# Patient Record
Sex: MALE | Race: ASIAN
[De-identification: names, ages, dates, MRNs, and addresses within clinical notes are randomized per-mention and may not be internally consistent; named-entity substitution may affect disease eponyms.]

---

## 2019-01-25 ENCOUNTER — TRANSCRIPTION ENCOUNTER (OUTPATIENT)
Age: 59
End: 2019-01-25

## 2019-02-06 PROBLEM — Z00.00 ENCOUNTER FOR PREVENTIVE HEALTH EXAMINATION: Status: ACTIVE | Noted: 2019-02-06

## 2019-02-12 ENCOUNTER — APPOINTMENT (OUTPATIENT)
Dept: ORTHOPEDIC SURGERY | Facility: CLINIC | Age: 59
End: 2019-02-12
Payer: COMMERCIAL

## 2019-02-12 VITALS
SYSTOLIC BLOOD PRESSURE: 135 MMHG | HEIGHT: 69 IN | WEIGHT: 200 LBS | HEART RATE: 77 BPM | DIASTOLIC BLOOD PRESSURE: 73 MMHG | BODY MASS INDEX: 29.62 KG/M2

## 2019-02-12 DIAGNOSIS — Z86.39 PERSONAL HISTORY OF OTHER ENDOCRINE, NUTRITIONAL AND METABOLIC DISEASE: ICD-10-CM

## 2019-02-12 DIAGNOSIS — M54.5 LOW BACK PAIN: ICD-10-CM

## 2019-02-12 DIAGNOSIS — Z78.9 OTHER SPECIFIED HEALTH STATUS: ICD-10-CM

## 2019-02-12 PROCEDURE — 99204 OFFICE O/P NEW MOD 45 MIN: CPT

## 2019-02-12 PROCEDURE — 72100 X-RAY EXAM L-S SPINE 2/3 VWS: CPT

## 2019-02-12 NOTE — PHYSICAL EXAM
[UE/LE] : Sensory: Intact in bilateral upper & lower extremities [ALL] : Biceps, brachioradialis, triceps, patellar, ankle and plantar 2+ and symmetric bilaterally [Normal] : Oriented to person, place, and time, insight and judgement were intact and the affect was normal [Harper's Sign] : negative Harper's sign [Pronator Drift] : negative pronator drift [SLR] : negative straight leg raise [de-identified] : NTTP L spine and b/l paraspinals L region. \par Skin intact L spine. No rashes, ulcers, blisters. \par No lymphedema. \par Rapid alternating movements- Intact. \par Full and non-painful ROM RUE, LUE, RLE, and LLE. Skin intact RUE, LUE, RLE, and LLE. No rashes, blisters, ulcers. NTTP RUE, LUE, RLE, and LLE. No evidence of dislocation or subluxation B/L.\par  [de-identified] : 2 views AP and Lat of LS Spine from [id number] . Read and dictated by Dr. Quentin Vasquez Board Certified orthopaedic surgeon 02/12/19 - NL\par CT of T/L Spine - images and reports reviewed\par

## 2019-02-12 NOTE — HISTORY OF PRESENT ILLNESS
[Ice] : relieved by ice [Recumbency] : relieved by recumbency [Rest] : relieved by rest [de-identified] : Pt presents with c/o constant low back pain s/p slip while going down the stairs  on 01/24/19. Pain is present with movement. he denies radiating pain, numbness,tingling to B/l LE. Pt took meloxicam ans uses cream to alleviate pain. Pt had a CT done. Pt does not participate with PT at this time [4] : an average pain level of 4/10 [3] : a minimum pain level of 3/10 [7] : a maximum pain level of 7/10 [Constant] : ~He/She~ states the symptoms seem to be constant [Ataxia] : no ataxia [Incontinence] : no incontinence [Loss of Dexterity] : good dexterity [Urinary Ret.] : no urinary retention [de-identified] : certain movement exacerbates pain

## 2021-12-15 ENCOUNTER — APPOINTMENT (OUTPATIENT)
Dept: OTOLARYNGOLOGY | Facility: CLINIC | Age: 61
End: 2021-12-15
Payer: MEDICAID

## 2021-12-15 VITALS
BODY MASS INDEX: 28.14 KG/M2 | SYSTOLIC BLOOD PRESSURE: 111 MMHG | WEIGHT: 190 LBS | HEART RATE: 80 BPM | DIASTOLIC BLOOD PRESSURE: 70 MMHG | HEIGHT: 69 IN

## 2021-12-15 DIAGNOSIS — H66.90 OTITIS MEDIA, UNSPECIFIED, UNSPECIFIED EAR: ICD-10-CM

## 2021-12-15 PROCEDURE — 99203 OFFICE O/P NEW LOW 30 MIN: CPT

## 2021-12-15 PROCEDURE — 92567 TYMPANOMETRY: CPT

## 2021-12-15 PROCEDURE — 92557 COMPREHENSIVE HEARING TEST: CPT

## 2021-12-15 RX ORDER — CIPROFLOXACIN AND DEXAMETHASONE 3; 1 MG/ML; MG/ML
0.3-0.1 SUSPENSION/ DROPS AURICULAR (OTIC)
Qty: 1 | Refills: 2 | Status: ACTIVE | COMMUNITY
Start: 2021-12-15 | End: 1900-01-01

## 2021-12-15 NOTE — ASSESSMENT
[FreeTextEntry1] : WATER PERCAUTION\par CIPRODEX BID\par  MILD EARLY PRESBYCAUSIA\par F/U 2 WEEKS PRN

## 2021-12-15 NOTE — HISTORY OF PRESENT ILLNESS
[de-identified] : RIGHT EAR DISCOMFORT FOR THE PAST FEW DAYS\par MEDICAL HX REVIEWED\par WORKS AS \par COVID VACCINATION UP TO DATE

## 2021-12-15 NOTE — PHYSICAL EXAM
[Normal] : mucosa is normal [Midline] : trachea located in midline position [de-identified] : RIGHT CANAL IRRITATION EXTENDING TO TM

## 2021-12-22 ENCOUNTER — APPOINTMENT (OUTPATIENT)
Dept: OTOLARYNGOLOGY | Facility: CLINIC | Age: 61
End: 2021-12-22
Payer: MEDICAID

## 2021-12-22 VITALS — BODY MASS INDEX: 28.14 KG/M2 | HEIGHT: 69 IN | WEIGHT: 190 LBS

## 2021-12-22 PROCEDURE — 99213 OFFICE O/P EST LOW 20 MIN: CPT

## 2021-12-22 NOTE — HISTORY OF PRESENT ILLNESS
[de-identified] : RIGHT EAR DISCOMFORT not improving\par MEDICAL HX REVIEWED\par WORKS AS \par COVID VACCINATION UP TO DATE

## 2021-12-22 NOTE — PHYSICAL EXAM
[de-identified] : RIGHT CANAL IRRITATION EXTENDING TO TM/ debris suctioned/ symptoms improved [Normal] : mucosa is normal [Midline] : trachea located in midline position

## 2021-12-28 ENCOUNTER — APPOINTMENT (OUTPATIENT)
Dept: OTOLARYNGOLOGY | Facility: CLINIC | Age: 61
End: 2021-12-28
Payer: MEDICAID

## 2021-12-28 VITALS
WEIGHT: 190 LBS | BODY MASS INDEX: 28.14 KG/M2 | DIASTOLIC BLOOD PRESSURE: 78 MMHG | HEIGHT: 69 IN | SYSTOLIC BLOOD PRESSURE: 117 MMHG | HEART RATE: 96 BPM

## 2021-12-28 DIAGNOSIS — H60.90 UNSPECIFIED OTITIS EXTERNA, UNSPECIFIED EAR: ICD-10-CM

## 2021-12-28 PROCEDURE — 99213 OFFICE O/P EST LOW 20 MIN: CPT

## 2021-12-28 NOTE — PHYSICAL EXAM
[de-identified] : RIGHT CANAL IRRITATION EXTENDING TO TM/ FUNGAL DEBRIS SUCTIONED/ CLOTRIMAZOL APPLIEDimproved [Normal] : mucosa is normal [Midline] : trachea located in midline position

## 2021-12-28 NOTE — HISTORY OF PRESENT ILLNESS
[de-identified] : RIGHT EAR DISCOMFORT IMPROVING\par MEDICAL HX REVIEWED\par WORKS AS \par COVID VACCINATION UP TO DATE

## 2022-01-04 ENCOUNTER — APPOINTMENT (OUTPATIENT)
Dept: OTOLARYNGOLOGY | Facility: CLINIC | Age: 62
End: 2022-01-04
Payer: MEDICAID

## 2022-01-04 VITALS
HEIGHT: 69 IN | SYSTOLIC BLOOD PRESSURE: 123 MMHG | BODY MASS INDEX: 28.14 KG/M2 | DIASTOLIC BLOOD PRESSURE: 72 MMHG | WEIGHT: 190 LBS | HEART RATE: 72 BPM

## 2022-01-04 DIAGNOSIS — B36.9 SUPERFICIAL MYCOSIS, UNSPECIFIED: ICD-10-CM

## 2022-01-04 DIAGNOSIS — H62.40 SUPERFICIAL MYCOSIS, UNSPECIFIED: ICD-10-CM

## 2022-01-04 PROCEDURE — 99213 OFFICE O/P EST LOW 20 MIN: CPT

## 2022-01-04 NOTE — HISTORY OF PRESENT ILLNESS
[de-identified] : RIGHT EAR  IMPROVING\par MEDICAL HX REVIEWED\par WORKS AS \par COVID VACCINATION UP TO DATE

## 2022-01-04 NOTE — PHYSICAL EXAM
[de-identified] : Normal canal / no irritation [Normal] : mucosa is normal [Midline] : trachea located in midline position

## 2022-11-08 ENCOUNTER — APPOINTMENT (OUTPATIENT)
Dept: OTOLARYNGOLOGY | Facility: CLINIC | Age: 62
End: 2022-11-08

## 2022-11-08 VITALS
HEIGHT: 69 IN | SYSTOLIC BLOOD PRESSURE: 140 MMHG | WEIGHT: 188 LBS | DIASTOLIC BLOOD PRESSURE: 88 MMHG | HEART RATE: 72 BPM | BODY MASS INDEX: 27.85 KG/M2

## 2022-11-08 DIAGNOSIS — H93.19 TINNITUS, UNSPECIFIED EAR: ICD-10-CM

## 2022-11-08 PROCEDURE — 99214 OFFICE O/P EST MOD 30 MIN: CPT

## 2022-11-08 PROCEDURE — 92557 COMPREHENSIVE HEARING TEST: CPT

## 2022-11-08 PROCEDURE — 92567 TYMPANOMETRY: CPT

## 2022-11-08 NOTE — HISTORY OF PRESENT ILLNESS
[de-identified] : RIGHT EAR TINNITUS FOR THE PAST FEW DAYS FOLLOWING COLD SYMPTOMS\par MEDICAL HX REVIEWED\par TOOKSOME ANTIBIOTICS\par WORKS AS \par

## 2022-11-08 NOTE — ASSESSMENT
[FreeTextEntry1] :  UNCHANGED\par ASKED HIM TO UPDATE HIS PHYSICAL AND BLOOD WORK WITH HIS PMD\par HEARING AID/ SOUND MASKING DISCUSSED\par WILL CONSIDER ABR\par F/U AFTER ABOVE\par VIT B COMPLEX

## 2022-11-08 NOTE — DATA REVIEWED
[de-identified] : \par -TYMPS: TYPE A AD, TYPE Ad AS\par -ESSENTIALLY MILD SLOPING TO MOD SNHL [phone] HZ AU

## 2023-01-11 ENCOUNTER — APPOINTMENT (OUTPATIENT)
Dept: OTOLARYNGOLOGY | Facility: CLINIC | Age: 63
End: 2023-01-11
Payer: MEDICAID

## 2023-01-11 VITALS
DIASTOLIC BLOOD PRESSURE: 79 MMHG | SYSTOLIC BLOOD PRESSURE: 124 MMHG | WEIGHT: 190 LBS | BODY MASS INDEX: 28.14 KG/M2 | HEIGHT: 69 IN | HEART RATE: 86 BPM

## 2023-01-11 DIAGNOSIS — J06.9 ACUTE UPPER RESPIRATORY INFECTION, UNSPECIFIED: ICD-10-CM

## 2023-01-11 PROCEDURE — 99214 OFFICE O/P EST MOD 30 MIN: CPT

## 2023-01-11 RX ORDER — AZITHROMYCIN 250 MG/1
250 TABLET, FILM COATED ORAL
Qty: 1 | Refills: 3 | Status: ACTIVE | COMMUNITY
Start: 2023-01-11 | End: 1900-01-01

## 2023-01-11 NOTE — HISTORY OF PRESENT ILLNESS
[de-identified] : NASAL CONGESTION FOR THE PAST FEW DAYS\par  AND HIGH RISK FOR VIRAL EXPOSURE JOB\par EAR SYMPTOMS RESOLVED\par MEDICAL HX REVIEWED

## 2023-01-11 NOTE — PHYSICAL EXAM
[de-identified] : NASAL MUCOSAL CONGESTION [Normal] : mucosa is normal [Midline] : trachea located in midline position

## 2023-07-25 ENCOUNTER — APPOINTMENT (OUTPATIENT)
Dept: OTOLARYNGOLOGY | Facility: CLINIC | Age: 63
End: 2023-07-25
Payer: MEDICAID

## 2023-07-25 VITALS
DIASTOLIC BLOOD PRESSURE: 72 MMHG | SYSTOLIC BLOOD PRESSURE: 115 MMHG | WEIGHT: 187 LBS | BODY MASS INDEX: 27.7 KG/M2 | HEIGHT: 69 IN | HEART RATE: 80 BPM

## 2023-07-25 PROCEDURE — 31575 DIAGNOSTIC LARYNGOSCOPY: CPT

## 2023-07-25 PROCEDURE — 99214 OFFICE O/P EST MOD 30 MIN: CPT | Mod: 25

## 2023-07-25 NOTE — HISTORY OF PRESENT ILLNESS
[de-identified] : THYROID DISORDER\par ABNORMAL THYROID TEST RESULTS IN FAVOR OF HYPOTHYROISM\par HX OF THYROIDECTOMY 2021\par REPORTS OF SURGERY NOT AVAILABLE\par DOES NOT HAVE ANY ENDOCRINOLOGIST\par MEDICAL HX REVIEWED\par NO DYSPHAGIA\par NO PAIN\par NO DYSPHONIA

## 2023-07-25 NOTE — PHYSICAL EXAM
[de-identified] : SCAR OF THYROIDECTOMY [Normal] : mucosa is normal [Midline] : trachea located in midline position

## 2023-09-14 ENCOUNTER — RX ONLY (RX ONLY)
Age: 63
End: 2023-09-14

## 2023-09-14 ENCOUNTER — OFFICE (OUTPATIENT)
Dept: URBAN - METROPOLITAN AREA CLINIC 109 | Facility: CLINIC | Age: 63
Setting detail: OPHTHALMOLOGY
End: 2023-09-14
Payer: COMMERCIAL

## 2023-09-14 DIAGNOSIS — H02.834: ICD-10-CM

## 2023-09-14 DIAGNOSIS — E11.9: ICD-10-CM

## 2023-09-14 DIAGNOSIS — H02.832: ICD-10-CM

## 2023-09-14 DIAGNOSIS — H02.835: ICD-10-CM

## 2023-09-14 DIAGNOSIS — H02.831: ICD-10-CM

## 2023-09-14 PROCEDURE — 99203 OFFICE O/P NEW LOW 30 MIN: CPT | Performed by: OPHTHALMOLOGY

## 2023-09-14 ASSESSMENT — TONOMETRY
OS_IOP_MMHG: 13
OD_IOP_MMHG: 16
OS_IOP_MMHG: 16
OD_IOP_MMHG: 15

## 2023-09-14 ASSESSMENT — REFRACTION_CURRENTRX
OD_AXIS: 50
OD_CYLINDER: -0.50
OS_AXIS: 0
OS_CYLINDER: 0.00
OS_OVR_VA: 20/
OD_SPHERE: +2.25
OS_SPHERE: +2.50
OD_OVR_VA: 20/

## 2023-09-14 ASSESSMENT — REFRACTION_AUTOREFRACTION
OD_CYLINDER: -0.75
OS_AXIS: 113
OS_SPHERE: +1.00
OD_AXIS: 73
OS_CYLINDER: -1.00
OD_SPHERE: +0.50

## 2023-09-14 ASSESSMENT — SPHEQUIV_DERIVED
OD_SPHEQUIV: 0.125
OS_SPHEQUIV: 0.5

## 2023-09-14 ASSESSMENT — KERATOMETRY
OD_K2POWER_DIOPTERS: 41.75
OS_K2POWER_DIOPTERS: 41.75
OD_AXISANGLE_DEGREES: 108
OD_K1POWER_DIOPTERS: 40.75
OS_K1POWER_DIOPTERS: 41.00
OS_AXISANGLE_DEGREES: 76

## 2023-09-14 ASSESSMENT — VISUAL ACUITY
OD_BCVA: 20/20-2
OS_BCVA: 20/20

## 2023-09-14 ASSESSMENT — AXIALLENGTH_DERIVED
OD_AL: 24.3962
OS_AL: 24.1925

## 2023-09-14 ASSESSMENT — LID POSITION - DERMATOCHALASIS
OS_DERMATOCHALASIS: 1+ 2+
OD_DERMATOCHALASIS: 1+ 2+

## 2023-09-14 ASSESSMENT — CONFRONTATIONAL VISUAL FIELD TEST (CVF)
OD_FINDINGS: FULL
OS_FINDINGS: FULL

## 2023-10-03 ENCOUNTER — OFFICE (OUTPATIENT)
Dept: URBAN - METROPOLITAN AREA CLINIC 109 | Facility: CLINIC | Age: 63
Setting detail: OPHTHALMOLOGY
End: 2023-10-03
Payer: COMMERCIAL

## 2023-10-03 DIAGNOSIS — H02.832: ICD-10-CM

## 2023-10-03 DIAGNOSIS — H02.831: ICD-10-CM

## 2023-10-03 DIAGNOSIS — H25.013: ICD-10-CM

## 2023-10-03 DIAGNOSIS — E11.9: ICD-10-CM

## 2023-10-03 DIAGNOSIS — H02.835: ICD-10-CM

## 2023-10-03 DIAGNOSIS — H02.834: ICD-10-CM

## 2023-10-03 PROCEDURE — 92014 COMPRE OPH EXAM EST PT 1/>: CPT | Performed by: OPHTHALMOLOGY

## 2023-10-03 ASSESSMENT — REFRACTION_CURRENTRX
OD_SPHERE: +2.25
OD_OVR_VA: 20/
OS_SPHERE: +2.50
OS_AXIS: 0
OD_AXIS: 50
OS_CYLINDER: 0.00
OD_CYLINDER: -0.50
OS_OVR_VA: 20/

## 2023-10-03 ASSESSMENT — REFRACTION_AUTOREFRACTION
OD_AXIS: 73
OD_SPHERE: +0.50
OD_CYLINDER: -0.75
OS_SPHERE: +1.00
OS_CYLINDER: -1.00
OS_AXIS: 113

## 2023-10-03 ASSESSMENT — AXIALLENGTH_DERIVED
OD_AL: 24.3962
OS_AL: 24.1925

## 2023-10-03 ASSESSMENT — KERATOMETRY
OS_K2POWER_DIOPTERS: 41.75
OS_AXISANGLE_DEGREES: 76
OD_K1POWER_DIOPTERS: 40.75
OD_AXISANGLE_DEGREES: 108
OD_K2POWER_DIOPTERS: 41.75
OS_K1POWER_DIOPTERS: 41.00

## 2023-10-03 ASSESSMENT — TONOMETRY
OD_IOP_MMHG: 13
OS_IOP_MMHG: 14
OS_IOP_MMHG: 13
OD_IOP_MMHG: 16

## 2023-10-03 ASSESSMENT — LID POSITION - DERMATOCHALASIS
OD_DERMATOCHALASIS: 1+ 2+
OS_DERMATOCHALASIS: 1+ 2+

## 2023-10-03 ASSESSMENT — CONFRONTATIONAL VISUAL FIELD TEST (CVF)
OD_FINDINGS: FULL
OS_FINDINGS: FULL

## 2023-10-03 ASSESSMENT — SPHEQUIV_DERIVED
OS_SPHEQUIV: 0.5
OD_SPHEQUIV: 0.125

## 2023-10-03 ASSESSMENT — VISUAL ACUITY
OD_BCVA: 20/20-1
OS_BCVA: 20/20

## 2024-04-03 ENCOUNTER — APPOINTMENT (OUTPATIENT)
Dept: OTOLARYNGOLOGY | Facility: CLINIC | Age: 64
End: 2024-04-03

## 2024-05-30 ENCOUNTER — NON-APPOINTMENT (OUTPATIENT)
Age: 64
End: 2024-05-30

## 2024-05-31 ENCOUNTER — APPOINTMENT (OUTPATIENT)
Dept: OTOLARYNGOLOGY | Facility: CLINIC | Age: 64
End: 2024-05-31
Payer: MEDICARE

## 2024-05-31 VITALS
WEIGHT: 187 LBS | DIASTOLIC BLOOD PRESSURE: 61 MMHG | HEIGHT: 69 IN | HEART RATE: 84 BPM | SYSTOLIC BLOOD PRESSURE: 107 MMHG | BODY MASS INDEX: 27.7 KG/M2

## 2024-05-31 DIAGNOSIS — E07.9 DISORDER OF THYROID, UNSPECIFIED: ICD-10-CM

## 2024-05-31 PROCEDURE — 99213 OFFICE O/P EST LOW 20 MIN: CPT

## 2024-05-31 NOTE — REASON FOR VISIT
Patient given Rx for glasses. [Subsequent Evaluation] : a subsequent evaluation for [FreeTextEntry2] : thyroid

## 2024-05-31 NOTE — HISTORY OF PRESENT ILLNESS
[de-identified] : TSH SLIGHTLY HIGH DONE 3 MONTHS AGO REFERRED BY HIS PMD MEDICAL HX REVIEWED THYROIDECTOMY LAST YEAR ASYMPTOMATIC

## 2024-05-31 NOTE — PHYSICAL EXAM
[de-identified] : SCAR OF THYROIDECTOMY [Normal] : mucosa is normal [Midline] : trachea located in midline position

## 2024-05-31 NOTE — ASSESSMENT
[FreeTextEntry1] : WILL REFER TO ENDOCRINOLOGY NECK US PENDING F/U AFTER ABOVE CONTINUE CURRENT DOSE

## 2024-09-18 ENCOUNTER — OFFICE (OUTPATIENT)
Dept: URBAN - METROPOLITAN AREA CLINIC 109 | Facility: CLINIC | Age: 64
Setting detail: OPHTHALMOLOGY
End: 2024-09-18
Payer: MEDICARE

## 2024-09-18 DIAGNOSIS — H02.831: ICD-10-CM

## 2024-09-18 DIAGNOSIS — H25.013: ICD-10-CM

## 2024-09-18 DIAGNOSIS — E11.9: ICD-10-CM

## 2024-09-18 DIAGNOSIS — H02.834: ICD-10-CM

## 2024-09-18 DIAGNOSIS — H02.832: ICD-10-CM

## 2024-09-18 DIAGNOSIS — H02.835: ICD-10-CM

## 2024-09-18 PROCEDURE — 92012 INTRM OPH EXAM EST PATIENT: CPT | Performed by: OPHTHALMOLOGY

## 2024-09-18 ASSESSMENT — LID POSITION - DERMATOCHALASIS
OD_DERMATOCHALASIS: 1+ 2+
OS_DERMATOCHALASIS: 1+ 2+

## 2024-09-18 ASSESSMENT — CONFRONTATIONAL VISUAL FIELD TEST (CVF)
OD_FINDINGS: FULL
OS_FINDINGS: FULL

## 2024-10-08 ENCOUNTER — OFFICE (OUTPATIENT)
Dept: URBAN - METROPOLITAN AREA CLINIC 109 | Facility: CLINIC | Age: 64
Setting detail: OPHTHALMOLOGY
End: 2024-10-08
Payer: MEDICARE

## 2024-10-08 DIAGNOSIS — E11.9: ICD-10-CM

## 2024-10-08 DIAGNOSIS — H02.834: ICD-10-CM

## 2024-10-08 DIAGNOSIS — H25.013: ICD-10-CM

## 2024-10-08 DIAGNOSIS — H02.831: ICD-10-CM

## 2024-10-08 DIAGNOSIS — H02.832: ICD-10-CM

## 2024-10-08 DIAGNOSIS — H02.835: ICD-10-CM

## 2024-10-08 PROCEDURE — 92014 COMPRE OPH EXAM EST PT 1/>: CPT | Performed by: OPHTHALMOLOGY

## 2024-10-08 ASSESSMENT — REFRACTION_MANIFEST
OD_SPHERE: PL
OS_VA1: 20/20-
OD_VA1: 20/20-
OS_SPHERE: PL

## 2024-10-08 ASSESSMENT — REFRACTION_AUTOREFRACTION
OS_SPHERE: +1.00
OS_AXIS: 113
OD_SPHERE: +0.50
OS_CYLINDER: -1.00
OD_CYLINDER: -0.75
OD_AXIS: 73

## 2024-10-08 ASSESSMENT — CONFRONTATIONAL VISUAL FIELD TEST (CVF)
OD_FINDINGS: FULL
OS_FINDINGS: FULL

## 2024-10-08 ASSESSMENT — KERATOMETRY
OS_AXISANGLE_DEGREES: 76
OS_K1POWER_DIOPTERS: 41.00
OD_AXISANGLE_DEGREES: 108
OS_K2POWER_DIOPTERS: 41.75
OD_K2POWER_DIOPTERS: 41.75
OD_K1POWER_DIOPTERS: 40.75

## 2024-10-08 ASSESSMENT — REFRACTION_CURRENTRX
OD_OVR_VA: 20/
OD_SPHERE: +2.25
OD_AXIS: 50
OS_SPHERE: +2.50
OS_OVR_VA: 20/
OS_CYLINDER: 0.00
OD_CYLINDER: -0.50
OS_AXIS: 0

## 2024-10-08 ASSESSMENT — VISUAL ACUITY
OD_BCVA: 20/25-1
OS_BCVA: 20/20-1

## 2024-10-08 ASSESSMENT — TONOMETRY
OS_IOP_MMHG: 12
OD_IOP_MMHG: 11

## 2024-10-08 ASSESSMENT — LID POSITION - DERMATOCHALASIS
OS_DERMATOCHALASIS: 1+ 2+
OD_DERMATOCHALASIS: 1+ 2+

## 2024-10-15 ENCOUNTER — APPOINTMENT (OUTPATIENT)
Dept: ENDOCRINOLOGY | Facility: CLINIC | Age: 64
End: 2024-10-15

## 2025-03-12 ENCOUNTER — OFFICE (OUTPATIENT)
Dept: URBAN - METROPOLITAN AREA CLINIC 109 | Facility: CLINIC | Age: 65
Setting detail: OPHTHALMOLOGY
End: 2025-03-12
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
## 2025-03-12 PROCEDURE — NO SHOW FE NO SHOW FEE: Performed by: OPHTHALMOLOGY
